# Patient Record
Sex: FEMALE | Race: OTHER
[De-identification: names, ages, dates, MRNs, and addresses within clinical notes are randomized per-mention and may not be internally consistent; named-entity substitution may affect disease eponyms.]

---

## 2017-09-06 ENCOUNTER — HOSPITAL ENCOUNTER (EMERGENCY)
Dept: HOSPITAL 54 - ER | Age: 67
Discharge: HOME | End: 2017-09-06
Payer: MEDICARE

## 2017-09-06 VITALS — BODY MASS INDEX: 26.81 KG/M2 | HEIGHT: 61 IN | WEIGHT: 142 LBS

## 2017-09-06 VITALS — SYSTOLIC BLOOD PRESSURE: 128 MMHG | DIASTOLIC BLOOD PRESSURE: 78 MMHG

## 2017-09-06 DIAGNOSIS — I10: ICD-10-CM

## 2017-09-06 DIAGNOSIS — J45.909: ICD-10-CM

## 2017-09-06 DIAGNOSIS — Z90.710: ICD-10-CM

## 2017-09-06 DIAGNOSIS — J06.9: ICD-10-CM

## 2017-09-06 DIAGNOSIS — N39.0: Primary | ICD-10-CM

## 2017-09-06 DIAGNOSIS — Z90.49: ICD-10-CM

## 2017-09-06 LAB
PH UR STRIP: 7 [PH] (ref 5–8)
RBC #/AREA URNS HPF: (no result) /HPF (ref 0–2)
UROBILINOGEN UR STRIP-MCNC: 0.2 EU/DL
WBC #/AREA URNS HPF: (no result) /HPF (ref 0–3)

## 2017-09-06 PROCEDURE — A4606 OXYGEN PROBE USED W OXIMETER: HCPCS

## 2017-09-06 PROCEDURE — Z7610: HCPCS

## 2017-09-06 NOTE — NUR
BB SELF FOR FEVER AND DRY COUGH SINCE LAST NIGHT. NO MEDS TAKEN FOR FEVER. 
CURRENTLY TAKING CIPRO ABX. SEEN BY PA FOR EVAL. NOTED TACHY. SAFETY AND 
COMFORT MEASURES PROVIDED. WILL MONITOR.

## 2018-02-10 ENCOUNTER — HOSPITAL ENCOUNTER (INPATIENT)
Dept: HOSPITAL 54 - ER | Age: 68
LOS: 3 days | Discharge: HOME | DRG: 391 | End: 2018-02-13
Payer: MEDICARE

## 2018-02-10 VITALS — BODY MASS INDEX: 28.32 KG/M2 | WEIGHT: 150 LBS | HEIGHT: 61 IN

## 2018-02-10 VITALS — DIASTOLIC BLOOD PRESSURE: 92 MMHG | SYSTOLIC BLOOD PRESSURE: 143 MMHG

## 2018-02-10 DIAGNOSIS — Z90.49: ICD-10-CM

## 2018-02-10 DIAGNOSIS — E78.5: ICD-10-CM

## 2018-02-10 DIAGNOSIS — Z79.899: ICD-10-CM

## 2018-02-10 DIAGNOSIS — Z87.442: ICD-10-CM

## 2018-02-10 DIAGNOSIS — I10: ICD-10-CM

## 2018-02-10 DIAGNOSIS — E11.65: ICD-10-CM

## 2018-02-10 DIAGNOSIS — E66.9: ICD-10-CM

## 2018-02-10 DIAGNOSIS — Q63.1: ICD-10-CM

## 2018-02-10 DIAGNOSIS — Z90.710: ICD-10-CM

## 2018-02-10 DIAGNOSIS — K57.20: Primary | ICD-10-CM

## 2018-02-10 DIAGNOSIS — J45.909: ICD-10-CM

## 2018-02-10 DIAGNOSIS — K65.9: ICD-10-CM

## 2018-02-10 LAB
ALBUMIN SERPL BCP-MCNC: 3.7 G/DL (ref 3.4–5)
ALP SERPL-CCNC: 137 U/L (ref 46–116)
ALT SERPL W P-5'-P-CCNC: 22 U/L (ref 12–78)
AST SERPL W P-5'-P-CCNC: 23 U/L (ref 15–37)
BASOPHILS # BLD AUTO: 0.1 /CMM (ref 0–0.2)
BASOPHILS NFR BLD AUTO: 1.5 % (ref 0–2)
BILIRUB DIRECT SERPL-MCNC: 0.1 MG/DL (ref 0–0.2)
BILIRUB SERPL-MCNC: 0.3 MG/DL (ref 0.2–1)
BUN SERPL-MCNC: 12 MG/DL (ref 7–18)
CALCIUM SERPL-MCNC: 8.7 MG/DL (ref 8.5–10.1)
CHLORIDE SERPL-SCNC: 103 MMOL/L (ref 98–107)
CO2 SERPL-SCNC: 28 MMOL/L (ref 21–32)
CREAT SERPL-MCNC: 0.7 MG/DL (ref 0.6–1.3)
EOSINOPHIL # BLD AUTO: 0.1 /CMM (ref 0–0.7)
EOSINOPHIL NFR BLD AUTO: 0.9 % (ref 0–6)
GLUCOSE SERPL-MCNC: 113 MG/DL (ref 74–106)
HCT VFR BLD AUTO: 36 % (ref 33–45)
HGB BLD-MCNC: 13 G/DL (ref 11.5–14.8)
LIPASE SERPL-CCNC: 156 U/L (ref 73–393)
LYMPHOCYTES NFR BLD AUTO: 1.2 /CMM (ref 0.8–4.8)
LYMPHOCYTES NFR BLD AUTO: 15.6 % (ref 20–44)
MCH RBC QN AUTO: 32 PG (ref 26–33)
MCHC RBC AUTO-ENTMCNC: 36 G/DL (ref 31–36)
MCV RBC AUTO: 90 FL (ref 82–100)
MONOCYTES NFR BLD AUTO: 0.4 /CMM (ref 0.1–1.3)
MONOCYTES NFR BLD AUTO: 6 % (ref 2–12)
NEUTROPHILS # BLD AUTO: 5.7 /CMM (ref 1.8–8.9)
NEUTROPHILS NFR BLD AUTO: 76 % (ref 43–81)
PH UR STRIP: 7.5 [PH] (ref 5–8)
PLATELET # BLD AUTO: 184 /CMM (ref 150–450)
POTASSIUM SERPL-SCNC: 3.7 MMOL/L (ref 3.5–5.1)
PROT SERPL-MCNC: 8.1 G/DL (ref 6.4–8.2)
RBC # BLD AUTO: 4.04 MIL/UL (ref 4–5.2)
RDW COEFFICIENT OF VARIATION: 12.3 (ref 11.5–15)
SODIUM SERPL-SCNC: 134 MMOL/L (ref 136–145)
UROBILINOGEN UR STRIP-MCNC: 0.2 EU/DL
WBC NRBC COR # BLD AUTO: 7.5 K/UL (ref 4.3–11)

## 2018-02-10 PROCEDURE — A4606 OXYGEN PROBE USED W OXIMETER: HCPCS

## 2018-02-10 PROCEDURE — A4216 STERILE WATER/SALINE, 10 ML: HCPCS

## 2018-02-10 PROCEDURE — Z7610: HCPCS

## 2018-02-10 RX ADMIN — ALBUTEROL SULFATE SCH MG: 2.5 SOLUTION RESPIRATORY (INHALATION) at 23:04

## 2018-02-10 NOTE — NUR
RN NOTES

RECEIVED PATIENT FROM ER FOR DX DIVERTICULITIS. AO X 3, ABLE TO MAKE NEEDS KNOWN. NO ACUTE 
DISTRESS NOTED. 3/10 ABDOMINAL PAIN AT THIS TIME. IV SITE PATENT, INTACT; FLUSHED. SKIN 
INTACT. ORIENTED TO ROOM AND UNIT. SAFETY REMINDERS GIVEN. ON LOW BED WITH BILATERAL UPPER 
SIDE RAILS UPS. CALL BELL WITHIN EASY REACH. WILL CONTINUE TO MONITOR. WAITING FOR ADMISSION 
ORDERS.

## 2018-02-10 NOTE — NUR
PRESENTS TO ER C/O LOWER ABDOMINAL PAIN x 1 WEEK. ALSO C/O NAUSEA. A/OX 4. 
BREATHING EVEN AND UNLABORED.  NO SOB. VITALS STABLE. SAFETY AND COMFORT 
MEASURES IN PLACE. AWAITING MD ORDERS.

## 2018-02-11 VITALS — DIASTOLIC BLOOD PRESSURE: 69 MMHG | SYSTOLIC BLOOD PRESSURE: 139 MMHG

## 2018-02-11 VITALS — SYSTOLIC BLOOD PRESSURE: 166 MMHG | DIASTOLIC BLOOD PRESSURE: 80 MMHG

## 2018-02-11 VITALS — SYSTOLIC BLOOD PRESSURE: 148 MMHG | DIASTOLIC BLOOD PRESSURE: 82 MMHG

## 2018-02-11 LAB
ALBUMIN SERPL BCP-MCNC: 3.3 G/DL (ref 3.4–5)
ALP SERPL-CCNC: 106 U/L (ref 46–116)
ALT SERPL W P-5'-P-CCNC: 25 U/L (ref 12–78)
AST SERPL W P-5'-P-CCNC: 23 U/L (ref 15–37)
BASOPHILS # BLD AUTO: 0 /CMM (ref 0–0.2)
BASOPHILS NFR BLD AUTO: 0.4 % (ref 0–2)
BILIRUB SERPL-MCNC: 0.6 MG/DL (ref 0.2–1)
BUN SERPL-MCNC: 7 MG/DL (ref 7–18)
CALCIUM SERPL-MCNC: 8.7 MG/DL (ref 8.5–10.1)
CHLORIDE SERPL-SCNC: 104 MMOL/L (ref 98–107)
CHOLEST SERPL-MCNC: 174 MG/DL (ref ?–200)
CO2 SERPL-SCNC: 24 MMOL/L (ref 21–32)
CREAT SERPL-MCNC: 0.7 MG/DL (ref 0.6–1.3)
EOSINOPHIL # BLD AUTO: 0 /CMM (ref 0–0.7)
EOSINOPHIL NFR BLD AUTO: 0.2 % (ref 0–6)
GLUCOSE SERPL-MCNC: 150 MG/DL (ref 74–106)
HCT VFR BLD AUTO: 36 % (ref 33–45)
HDLC SERPL-MCNC: 68 MG/DL (ref 40–60)
HGB BLD-MCNC: 12.5 G/DL (ref 11.5–14.8)
LDLC SERPL DIRECT ASSAY-MCNC: 107 MG/DL (ref 0–99)
LYMPHOCYTES NFR BLD AUTO: 0.8 /CMM (ref 0.8–4.8)
LYMPHOCYTES NFR BLD AUTO: 9.5 % (ref 20–44)
MAGNESIUM SERPL-MCNC: 2.1 MG/DL (ref 1.8–2.4)
MCH RBC QN AUTO: 32 PG (ref 26–33)
MCHC RBC AUTO-ENTMCNC: 34 G/DL (ref 31–36)
MCV RBC AUTO: 92 FL (ref 82–100)
MONOCYTES NFR BLD AUTO: 0.5 /CMM (ref 0.1–1.3)
MONOCYTES NFR BLD AUTO: 5.8 % (ref 2–12)
NEUTROPHILS # BLD AUTO: 6.9 /CMM (ref 1.8–8.9)
NEUTROPHILS NFR BLD AUTO: 84.1 % (ref 43–81)
PHOSPHATE SERPL-MCNC: 2.6 MG/DL (ref 2.5–4.9)
PLATELET # BLD AUTO: 167 /CMM (ref 150–450)
POTASSIUM SERPL-SCNC: 3.8 MMOL/L (ref 3.5–5.1)
PROT SERPL-MCNC: 7.6 G/DL (ref 6.4–8.2)
RBC # BLD AUTO: 3.95 MIL/UL (ref 4–5.2)
RDW COEFFICIENT OF VARIATION: 13.4 (ref 11.5–15)
SODIUM SERPL-SCNC: 136 MMOL/L (ref 136–145)
TRIGL SERPL-MCNC: 41 MG/DL (ref 30–150)
TSH SERPL DL<=0.005 MIU/L-ACNC: 1.37 UIU/ML (ref 0.36–3.74)
WBC NRBC COR # BLD AUTO: 8.2 K/UL (ref 4.3–11)

## 2018-02-11 RX ADMIN — Medication SCH MLS/HR: at 05:39

## 2018-02-11 RX ADMIN — ALBUTEROL SULFATE SCH MG: 2.5 SOLUTION RESPIRATORY (INHALATION) at 10:50

## 2018-02-11 RX ADMIN — Medication SCH MLS/HR: at 14:04

## 2018-02-11 RX ADMIN — FLUTICASONE FUROATE AND VILANTEROL TRIFENATATE SCH EACH: 100; 25 POWDER RESPIRATORY (INHALATION) at 09:36

## 2018-02-11 RX ADMIN — ALBUTEROL SULFATE SCH MG: 2.5 SOLUTION RESPIRATORY (INHALATION) at 15:30

## 2018-02-11 RX ADMIN — ALBUTEROL SULFATE SCH MG: 2.5 SOLUTION RESPIRATORY (INHALATION) at 19:30

## 2018-02-11 RX ADMIN — Medication SCH MLS/HR: at 21:02

## 2018-02-11 RX ADMIN — ALBUTEROL SULFATE SCH MG: 2.5 SOLUTION RESPIRATORY (INHALATION) at 02:51

## 2018-02-11 RX ADMIN — SODIUM CHLORIDE, SODIUM LACTATE, POTASSIUM CHLORIDE, AND CALCIUM CHLORIDE SCH MLS/HR: .6; .31; .03; .02 INJECTION, SOLUTION INTRAVENOUS at 00:27

## 2018-02-11 RX ADMIN — PANTOPRAZOLE SODIUM SCH MG: 40 TABLET, DELAYED RELEASE ORAL at 07:44

## 2018-02-11 RX ADMIN — LEVOFLOXACIN SCH MLS/HR: 500 INJECTION, SOLUTION INTRAVENOUS at 23:27

## 2018-02-11 RX ADMIN — LEVOFLOXACIN SCH MLS/HR: 500 INJECTION, SOLUTION INTRAVENOUS at 01:02

## 2018-02-11 RX ADMIN — SODIUM CHLORIDE, SODIUM LACTATE, POTASSIUM CHLORIDE, AND CALCIUM CHLORIDE SCH MLS/HR: .6; .31; .03; .02 INJECTION, SOLUTION INTRAVENOUS at 14:01

## 2018-02-11 RX ADMIN — ACETAMINOPHEN PRN MG: 325 TABLET ORAL at 12:24

## 2018-02-11 RX ADMIN — ALBUTEROL SULFATE SCH MG: 2.5 SOLUTION RESPIRATORY (INHALATION) at 07:01

## 2018-02-11 RX ADMIN — ACETAMINOPHEN PRN MG: 325 TABLET ORAL at 21:12

## 2018-02-11 NOTE — NUR
RT NOTE



PT REFUSED BREATHING TX TROUGH OUT SHIFT. PT STATES SHE DOESNT NEED THEM, THEY MAKE HER 
NAUSEOUS AND SHE HAS A INHALER AT BED SIDE. PT SPO2 AND BREATH SOUNDS MONITORED. NO DISTRESS 
NOTED. 

-------------------------------------------------------------------------------

Addendum: 02/11/18 at 1611 by AILIN MURGUIA RT

-------------------------------------------------------------------------------

Amended: Links added.

## 2018-02-11 NOTE — NUR
RN NOTES

PATIENT ASLEEP, EASILY AROUSABLE. RESPIRATIONS EVEN. NO SIGNS OF PAIN AT THIS TIME. DUE MEDS 
GIVEN WITH NO ASE NOTED. NEEDS ATTENDED. SAFETY PRECAUTIONS AND COMFORT MEASURES IN PLACE. 
WILL GIVE REPORT TO DAY SHIFT FOR CONTINUITY OF CARE.

## 2018-02-11 NOTE — NUR
pt refused tx. rn aware

-------------------------------------------------------------------------------

Addendum: 02/11/18 at 1936 by JOSE STOKES RT

-------------------------------------------------------------------------------

Amended: Links added.

## 2018-02-11 NOTE — NUR
MS RN NOTES



PATIENT IN BED, AWAKE. A/O X4, COOPERATIVE. CURRENTLY NPO EXCEPT MEDS AS ORDERED. IVC IN 
RIGHT AC PATENT AND INTACT, IVF D5 1/2NS + 20MEQ INFUSING AT 80ML/HR, DENIES ANY PAIN. CALL 
LIGHT WITHIN REACH. WILL CONT TO MONITOR.

## 2018-02-11 NOTE — NUR
MS RN CLOSING NOTES



PATIENT IN BED, A/O X4. ON CLEAR LIQUIDS DIET, TOLERATED WELL, NO EPISODE OF VOMITING OR 
NAUSEA. AMBULATE TO THE BATHROOM, STEADY GAIT AND BALANCE, REPORTED NO BOWEL MOVEMENT TODAY. 
MEDICATED WITH TYLENOL 650MG PO PRN FOR HEADACHE, EFFECTIVE. CALL LIGHT WITHIN REACH. 
POSSIBLE DC TOMORROW PER MD. WILL ENDORSE TO ONCOMING RN.

## 2018-02-11 NOTE — NUR
MS RN OPENING NOTES



RECEIVED PATIENT IN BED RESTING COMFORTABLY, A/Ox4.  ON RA, DENIES SOB, RESPIRATIONS EVEN 
AND UNLABORED. NO APPARENT DISTRESS OR DISCOMFORT NOTED AT THIS TIME. PATIENT IS AMBULATORY 
WITH STEADY GAIT, BRP. RIGHT AC HL PATIENT AND INTACT WITH IV FLUIDS INFUSING @ 80 ML/HR. 
PATIENT KEPT CLEAN AND COMFORTABLE. ON CLEAR LIQUID DIET, DENIES NAUSEA AND VOMITING AT THIS 
TIME. SAFETY MEASURES IN PLACE: CALL LIGHT WITHIN EASY REACH, BED IN LOW LOCKED POSITION, 
SIDE RAILS UP x2. WILL CONTINUE TO MONITOR.

## 2018-02-12 VITALS — SYSTOLIC BLOOD PRESSURE: 135 MMHG | DIASTOLIC BLOOD PRESSURE: 68 MMHG

## 2018-02-12 VITALS — DIASTOLIC BLOOD PRESSURE: 69 MMHG | SYSTOLIC BLOOD PRESSURE: 131 MMHG

## 2018-02-12 VITALS — SYSTOLIC BLOOD PRESSURE: 128 MMHG | DIASTOLIC BLOOD PRESSURE: 74 MMHG

## 2018-02-12 RX ADMIN — Medication SCH MLS/HR: at 21:37

## 2018-02-12 RX ADMIN — ALBUTEROL SULFATE SCH MG: 2.5 SOLUTION RESPIRATORY (INHALATION) at 19:30

## 2018-02-12 RX ADMIN — ALBUTEROL SULFATE SCH MG: 2.5 SOLUTION RESPIRATORY (INHALATION) at 15:30

## 2018-02-12 RX ADMIN — ALBUTEROL SULFATE SCH MG: 2.5 SOLUTION RESPIRATORY (INHALATION) at 00:02

## 2018-02-12 RX ADMIN — ALBUTEROL SULFATE SCH MG: 2.5 SOLUTION RESPIRATORY (INHALATION) at 23:30

## 2018-02-12 RX ADMIN — SODIUM CHLORIDE, SODIUM LACTATE, POTASSIUM CHLORIDE, AND CALCIUM CHLORIDE SCH MLS/HR: .6; .31; .03; .02 INJECTION, SOLUTION INTRAVENOUS at 16:43

## 2018-02-12 RX ADMIN — ALBUTEROL SULFATE SCH MG: 2.5 SOLUTION RESPIRATORY (INHALATION) at 11:30

## 2018-02-12 RX ADMIN — ALBUTEROL SULFATE SCH MG: 2.5 SOLUTION RESPIRATORY (INHALATION) at 07:35

## 2018-02-12 RX ADMIN — LEVOFLOXACIN SCH MLS/HR: 500 INJECTION, SOLUTION INTRAVENOUS at 23:16

## 2018-02-12 RX ADMIN — Medication SCH MLS/HR: at 12:02

## 2018-02-12 RX ADMIN — ALBUTEROL SULFATE SCH MG: 2.5 SOLUTION RESPIRATORY (INHALATION) at 03:01

## 2018-02-12 RX ADMIN — FLUTICASONE FUROATE AND VILANTEROL TRIFENATATE SCH EACH: 100; 25 POWDER RESPIRATORY (INHALATION) at 08:00

## 2018-02-12 RX ADMIN — PANTOPRAZOLE SODIUM SCH MG: 40 TABLET, DELAYED RELEASE ORAL at 08:00

## 2018-02-12 RX ADMIN — Medication SCH MLS/HR: at 05:44

## 2018-02-12 RX ADMIN — SODIUM CHLORIDE, SODIUM LACTATE, POTASSIUM CHLORIDE, AND CALCIUM CHLORIDE SCH MLS/HR: .6; .31; .03; .02 INJECTION, SOLUTION INTRAVENOUS at 02:00

## 2018-02-12 NOTE — NUR
MS RN CLOSING NOTES

PATIENT IN BED ASLEEP, EASILY AROUSED BY VERBAL STIMULI, A/Ox4.  ON RA, DENIES SOB, 
RESPIRATIONS EVEN AND UNLABORED. NO APPARENT DISTRESS OR DISCOMFORT NOTED AT THIS TIME. 
PATIENT AMBULATED TO BATHROOM WITH STEADY GAIT, BRP. RIGHT AC HL PATIENT AND INTACT WITH IV 
FLUIDS INFUSING @ 80 ML/HR. PATIENT KEPT CLEAN AND COMFORTABLE. ON CLEAR LIQUID DIET, DENIES 
NAUSEA AND VOMITING AT THIS TIME. SAFETY MEASURES IN PLACE: CALL LIGHT WITHIN EASY REACH, 
BED IN LOW LOCKED POSITION, SIDE RAILS UP x2. WILL ENDORSE TO DAY SHIFT FOR CONTINUATION OF 
CARE. .

## 2018-02-12 NOTE — NUR
RN NOTES



RECEIVED PT AWAKE, ALERT AND ORIENTED X4. ON ROOM AIR AND TOLERATED WELL WITH GOOD 
SATURATION. DENIES NAUSEA AND VOMITING, ABDOMINAL PAIN AT TOLERABLE LEVEL. IV ACCESS ON 
RIGHT AC PATENT AND INTACT WITH ONGOING IVF INFUSING WELL. PLAN OF CARE DISCUSSED WITH THE 
PT. WILL CONTINUE TO MONITOR PT.

## 2018-02-12 NOTE — NUR
MS/RN   End note



No further nausea or vomiting since zofran was administered. IVF continue to infuse at 
80ml/hr, no signs of infiltration see. Call light within reach, will continue to monitor and 
endorse to night shift.

## 2018-02-12 NOTE — NUR
MS/RN   S/B Dr Adamson



Seen by Dr Adamson - patient to remain in the hospital overnight as complaining of gas 
pain. New order given for tramadol as per patient, morphine is to strong. Awaiting pharmacy 
to verify order.

## 2018-02-12 NOTE — NUR
MS/RN   Patient received



Patient received from night shift.

A/O X4, complaining of abdominal pain 5/10 at this time, medication administered as ordered. 
Call light within reach, side rails X2 in upright position. Will continue to monitor and 
ensure safety.

## 2018-02-13 VITALS — SYSTOLIC BLOOD PRESSURE: 115 MMHG | DIASTOLIC BLOOD PRESSURE: 71 MMHG

## 2018-02-13 RX ADMIN — ALBUTEROL SULFATE SCH MG: 2.5 SOLUTION RESPIRATORY (INHALATION) at 14:49

## 2018-02-13 RX ADMIN — FLUTICASONE FUROATE AND VILANTEROL TRIFENATATE SCH EACH: 100; 25 POWDER RESPIRATORY (INHALATION) at 08:28

## 2018-02-13 RX ADMIN — SODIUM CHLORIDE, SODIUM LACTATE, POTASSIUM CHLORIDE, AND CALCIUM CHLORIDE SCH MLS/HR: .6; .31; .03; .02 INJECTION, SOLUTION INTRAVENOUS at 02:45

## 2018-02-13 RX ADMIN — Medication SCH MLS/HR: at 05:17

## 2018-02-13 RX ADMIN — PANTOPRAZOLE SODIUM SCH MG: 40 TABLET, DELAYED RELEASE ORAL at 08:27

## 2018-02-13 RX ADMIN — ALBUTEROL SULFATE SCH MG: 2.5 SOLUTION RESPIRATORY (INHALATION) at 07:23

## 2018-02-13 RX ADMIN — ALBUTEROL SULFATE SCH MG: 2.5 SOLUTION RESPIRATORY (INHALATION) at 10:37

## 2018-02-13 RX ADMIN — ALBUTEROL SULFATE SCH MG: 2.5 SOLUTION RESPIRATORY (INHALATION) at 03:30

## 2018-02-13 RX ADMIN — Medication SCH MLS/HR: at 12:02

## 2018-02-13 NOTE — NUR
RN NOTES



PT SLEPT WELL OVERNIGHT. NO EPISODE OF NAUSEA AND VOMITING. ABDOMINAL PAIN AT TOLERABLE 
LEVEL. ALL DUE MEDS GIVEN. NO SIGNIFICANT CHANGE IN PT CONDITION NOTED. ENDORSED TO MORNING 
RN FOR CONTINUITY OF CARE.

## 2018-02-13 NOTE — NUR
MS/RN   S/B Dr Jaeger



Seen by Dr Jaeger - may be discharged to home today, prescription for levaquin wrote, needs 
to continue medication for 10 more days. Follow up with primary care doctor in 5-10 days.

## 2018-02-13 NOTE — NUR
MS/RN   IVAB



IVAB hung as ordered, no reaction noted. Awaiting review by Dr Jaeger, for possible 
discharge.

## 2018-02-13 NOTE — NUR
MS/RN   Discharge



Patient discharged to home in stable condition, with instructions to return if experiencing 
any fever or sudden increase in pain. 

Heplock and name bands removed, all paperwork signed and copies provided. Escorted to main 
lobby by CNA.

## 2018-02-13 NOTE — NUR
MS/RN   Patient received



Patient received from night shift.

A/O X4, vital signs stable, in no distress, denies any pain or discomfort from gas pain. 
Safety measures in place, will continue to monitor and ensure safety.

## 2019-03-13 ENCOUNTER — HOSPITAL ENCOUNTER (EMERGENCY)
Dept: HOSPITAL 54 - ER | Age: 69
Discharge: HOME | End: 2019-03-13
Payer: MEDICARE

## 2019-03-13 VITALS — HEIGHT: 60 IN | WEIGHT: 150 LBS | BODY MASS INDEX: 29.45 KG/M2

## 2019-03-13 VITALS — SYSTOLIC BLOOD PRESSURE: 145 MMHG | DIASTOLIC BLOOD PRESSURE: 81 MMHG

## 2019-03-13 DIAGNOSIS — S62.621A: Primary | ICD-10-CM

## 2019-03-13 DIAGNOSIS — Z60.2: ICD-10-CM

## 2019-03-13 DIAGNOSIS — I10: ICD-10-CM
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
## 2019-03-13 DIAGNOSIS — R51: ICD-10-CM

## 2019-03-13 DIAGNOSIS — J45.909: ICD-10-CM

## 2019-03-13 DIAGNOSIS — M19.90: ICD-10-CM

## 2019-03-13 DIAGNOSIS — Z90.49: ICD-10-CM

## 2019-03-13 DIAGNOSIS — Z79.899: ICD-10-CM
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
## 2019-03-13 DIAGNOSIS — Z90.710: ICD-10-CM

## 2019-03-13 LAB
APPEARANCE UR: (no result)
BILIRUB UR QL STRIP: NEGATIVE
COLOR UR: YELLOW
GLUCOSE UR STRIP-MCNC: NEGATIVE MG/DL
HGB UR QL STRIP: NEGATIVE ERY/UL
KETONES UR STRIP-MCNC: NEGATIVE MG/DL
LEUKOCYTE ESTERASE UR QL STRIP: (no result)
NITRITE UR QL STRIP: NEGATIVE
PH UR STRIP: 7 [PH] (ref 5–8)
PROT UR QL STRIP: NEGATIVE MG/DL
RBC #/AREA URNS HPF: (no result) /HPF (ref 0–2)
UROBILINOGEN UR STRIP-MCNC: 0.2 EU/DL
WBC #/AREA URNS HPF: (no result) /HPF (ref 0–3)

## 2019-03-13 SDOH — SOCIAL STABILITY - SOCIAL INSECURITY: PROBLEMS RELATED TO LIVING ALONE: Z60.2

## 2019-03-13 NOTE — NUR
patient presented to the ER c/o left hand pain. on room air, breathing evenly 
and unlabored. kept comfortable. will continue to monitor accordingly.

## 2019-10-22 ENCOUNTER — HOSPITAL ENCOUNTER (EMERGENCY)
Dept: HOSPITAL 54 - ER | Age: 69
Discharge: HOME | End: 2019-10-22
Payer: MEDICARE

## 2019-10-22 VITALS — HEIGHT: 60 IN | BODY MASS INDEX: 29.84 KG/M2 | WEIGHT: 152 LBS

## 2019-10-22 VITALS — DIASTOLIC BLOOD PRESSURE: 81 MMHG | SYSTOLIC BLOOD PRESSURE: 128 MMHG

## 2019-10-22 DIAGNOSIS — R00.1: ICD-10-CM

## 2019-10-22 DIAGNOSIS — J45.909: ICD-10-CM

## 2019-10-22 DIAGNOSIS — R10.84: ICD-10-CM

## 2019-10-22 DIAGNOSIS — Z79.899: ICD-10-CM

## 2019-10-22 DIAGNOSIS — N39.0: Primary | ICD-10-CM

## 2019-10-22 DIAGNOSIS — Z90.710: ICD-10-CM

## 2019-10-22 DIAGNOSIS — I10: ICD-10-CM

## 2019-10-22 DIAGNOSIS — Z60.2: ICD-10-CM

## 2019-10-22 DIAGNOSIS — Z90.49: ICD-10-CM

## 2019-10-22 LAB
ALBUMIN SERPL BCP-MCNC: 4.1 G/DL (ref 3.4–5)
ALP SERPL-CCNC: 110 U/L (ref 46–116)
ALT SERPL W P-5'-P-CCNC: 22 U/L (ref 12–78)
AST SERPL W P-5'-P-CCNC: 24 U/L (ref 15–37)
BASOPHILS # BLD AUTO: 0 /CMM (ref 0–0.2)
BASOPHILS NFR BLD AUTO: 0.3 % (ref 0–2)
BILIRUB DIRECT SERPL-MCNC: 0.1 MG/DL (ref 0–0.2)
BILIRUB SERPL-MCNC: 0.6 MG/DL (ref 0.2–1)
BILIRUB UR QL STRIP: (no result)
BUN SERPL-MCNC: 13 MG/DL (ref 7–18)
CALCIUM SERPL-MCNC: 10.2 MG/DL (ref 8.5–10.1)
CHLORIDE SERPL-SCNC: 100 MMOL/L (ref 98–107)
CO2 SERPL-SCNC: 27 MMOL/L (ref 21–32)
COLOR UR: (no result)
CREAT SERPL-MCNC: 0.7 MG/DL (ref 0.6–1.3)
EOSINOPHIL NFR BLD AUTO: 0.4 % (ref 0–6)
GLUCOSE SERPL-MCNC: 113 MG/DL (ref 74–106)
HCT VFR BLD AUTO: 41 % (ref 33–45)
HGB BLD-MCNC: 13.6 G/DL (ref 11.5–14.8)
KETONES UR STRIP-MCNC: 15 MG/DL
LEUKOCYTE ESTERASE UR QL STRIP: (no result)
LIPASE SERPL-CCNC: 145 U/L (ref 73–393)
LYMPHOCYTES NFR BLD AUTO: 0.7 /CMM (ref 0.8–4.8)
LYMPHOCYTES NFR BLD AUTO: 6.2 % (ref 20–44)
MCHC RBC AUTO-ENTMCNC: 33 G/DL (ref 31–36)
MCV RBC AUTO: 95 FL (ref 82–100)
MONOCYTES NFR BLD AUTO: 0.5 /CMM (ref 0.1–1.3)
MONOCYTES NFR BLD AUTO: 4.8 % (ref 2–12)
NEUTROPHILS # BLD AUTO: 10 /CMM (ref 1.8–8.9)
NEUTROPHILS NFR BLD AUTO: 88.3 % (ref 43–81)
PH UR STRIP: 6 [PH] (ref 5–8)
PLATELET # BLD AUTO: 185 /CMM (ref 150–450)
POTASSIUM SERPL-SCNC: 3.7 MMOL/L (ref 3.5–5.1)
PROT SERPL-MCNC: 8.1 G/DL (ref 6.4–8.2)
PROT UR QL STRIP: 30 MG/DL
RBC # BLD AUTO: 4.35 MIL/UL (ref 4–5.2)
RBC #/AREA URNS HPF: (no result) /HPF (ref 0–2)
SODIUM SERPL-SCNC: 136 MMOL/L (ref 136–145)
UROBILINOGEN UR STRIP-MCNC: 1 EU/DL
WBC #/AREA URNS HPF: (no result) /HPF
WBC #/AREA URNS HPF: (no result) /HPF (ref 0–3)
WBC NRBC COR # BLD AUTO: 11.4 K/UL (ref 4.3–11)

## 2019-10-22 PROCEDURE — 71045 X-RAY EXAM CHEST 1 VIEW: CPT

## 2019-10-22 PROCEDURE — 81001 URINALYSIS AUTO W/SCOPE: CPT

## 2019-10-22 PROCEDURE — 96365 THER/PROPH/DIAG IV INF INIT: CPT

## 2019-10-22 PROCEDURE — 74176 CT ABD & PELVIS W/O CONTRAST: CPT

## 2019-10-22 PROCEDURE — 87040 BLOOD CULTURE FOR BACTERIA: CPT

## 2019-10-22 PROCEDURE — 93005 ELECTROCARDIOGRAM TRACING: CPT

## 2019-10-22 PROCEDURE — 83605 ASSAY OF LACTIC ACID: CPT

## 2019-10-22 PROCEDURE — 87086 URINE CULTURE/COLONY COUNT: CPT

## 2019-10-22 PROCEDURE — 80076 HEPATIC FUNCTION PANEL: CPT

## 2019-10-22 PROCEDURE — 85025 COMPLETE CBC W/AUTO DIFF WBC: CPT

## 2019-10-22 PROCEDURE — 99284 EMERGENCY DEPT VISIT MOD MDM: CPT

## 2019-10-22 PROCEDURE — 80048 BASIC METABOLIC PNL TOTAL CA: CPT

## 2019-10-22 PROCEDURE — 83690 ASSAY OF LIPASE: CPT

## 2019-10-22 PROCEDURE — 36415 COLL VENOUS BLD VENIPUNCTURE: CPT

## 2019-10-22 SDOH — SOCIAL STABILITY - SOCIAL INSECURITY: PROBLEMS RELATED TO LIVING ALONE: Z60.2

## 2019-10-22 NOTE — NUR
PATIENT CAME IN TO THE ER C/O DIFFUSE ABDOMINAL PAIN W NAUSEA SINCE THIS AM. 
DIZZINESS X 2 HOURS. ON ROOM AIR, BREATHING EVENLY AND UNLABORED. CONNECTED TO 
THE MONITOR AND PULSE OX. WILL CONTINUE TO MONITOR ACCORDINGLY.